# Patient Record
Sex: FEMALE | Race: WHITE | NOT HISPANIC OR LATINO | URBAN - METROPOLITAN AREA
[De-identification: names, ages, dates, MRNs, and addresses within clinical notes are randomized per-mention and may not be internally consistent; named-entity substitution may affect disease eponyms.]

---

## 2020-07-25 ENCOUNTER — TELEPHONE ENCOUNTER (OUTPATIENT)
Dept: URBAN - METROPOLITAN AREA CLINIC 13 | Facility: CLINIC | Age: 65
End: 2020-07-25

## 2020-07-25 RX ORDER — METOPROLOL TARTRATE 25 MG/1
TAKE 1 TABLET DAILY TABLET, FILM COATED ORAL
Refills: 0 | OUTPATIENT
End: 2018-11-30

## 2020-07-25 RX ORDER — POLYETHYLENE GLYCOL 3350, SODIUM SULFATE, SODIUM CHLORIDE, POTASSIUM CHLORIDE, ASCORBIC ACID, SODIUM ASCORBATE 7.5-2.691G
TAKE 1 ML AS DIRECTED KIT ORAL
Qty: 1 | Refills: 0 | OUTPATIENT
Start: 2013-06-05 | End: 2013-06-26

## 2020-07-25 RX ORDER — POLYETHYLENE GLYCOL 3350, SODIUM SULFATE, SODIUM CHLORIDE, POTASSIUM CHLORIDE, ASCORBIC ACID, SODIUM ASCORBATE 7.5-2.691G
TAKE 1 ML AS DIRECTED KIT ORAL
Qty: 1 | Refills: 0 | OUTPATIENT
Start: 2012-07-17 | End: 2012-08-13

## 2020-07-25 RX ORDER — ALBUTEROL SULFATE 2.5 MG/3ML
USE 1 UNIT DOSE IN NEBULIZER 4 TIMES DAILY SOLUTION RESPIRATORY (INHALATION)
Refills: 0 | OUTPATIENT
Start: 2018-11-30 | End: 2019-01-21

## 2020-07-25 RX ORDER — POLYETHYLENE GLYCOL 3350, SODIUM CHLORIDE, SODIUM BICARBONATE AND POTASSIUM CHLORIDE WITH LEMON FLAVOR 420; 11.2; 5.72; 1.48 G/4L; G/4L; G/4L; G/4L
TAKE 1/2 GALLON AT 5:00 PM DAY BEFORE PROCEDURE, TAKE SECOND 1/2 OF GALLON 6 HRS PRIOR TO PROCEDURE POWDER, FOR SOLUTION ORAL
Qty: 1 | Refills: 0 | OUTPATIENT
Start: 2018-11-30 | End: 2019-01-21

## 2020-07-25 RX ORDER — POLYETHYLENE GLYCOL 3350, SODIUM SULFATE, SODIUM CHLORIDE, POTASSIUM CHLORIDE, ASCORBIC ACID, SODIUM ASCORBATE 7.5-2.691G
TAKE 32 OZ AS DIRECTED 5:00PM THE EVENING BEFORE AND 6HR PRIOR TO PROCEDURE KIT ORAL
Qty: 1 | Refills: 0 | OUTPATIENT
Start: 2014-08-28 | End: 2014-09-24

## 2020-07-25 RX ORDER — IPRATROPIUM BROMIDE AND ALBUTEROL SULFATE 2.5; .5 MG/3ML; MG/3ML
USE 1 UNIT DOSE IN NEBULIZER EVERY 4 HOURS AS NEEDED SOLUTION RESPIRATORY (INHALATION)
Refills: 0 | OUTPATIENT
Start: 2018-11-30 | End: 2019-01-21

## 2020-07-25 RX ORDER — IBANDRONATE SODIUM 150 MG
1 (ONE) TABLET, ORAL, ONCE MONTHLY IN AM WITH WATER ON EMPTY STOMACH TABLET ORAL
Qty: 1 | Refills: 0 | OUTPATIENT
Start: 2011-11-08 | End: 2018-11-30

## 2020-07-25 RX ORDER — LOSARTAN POTASSIUM 50 MG/1
TAKE 1 TABLET EVERY DAY TABLET, FILM COATED ORAL
Qty: 30 | Refills: 0 | OUTPATIENT
Start: 2011-12-07 | End: 2018-11-30

## 2020-07-26 ENCOUNTER — TELEPHONE ENCOUNTER (OUTPATIENT)
Dept: URBAN - METROPOLITAN AREA CLINIC 13 | Facility: CLINIC | Age: 65
End: 2020-07-26

## 2020-07-26 RX ORDER — FLUTICASONE PROPIONATE 50 UG/1
SPRAY, METERED NASAL
Qty: 16 | Refills: 0 | Status: ACTIVE | COMMUNITY
Start: 2013-07-09

## 2020-07-26 RX ORDER — MAGNESIUM OXIDE 400 MG/1
TAKE 1 TABLET DAILY TABLET ORAL
Refills: 0 | Status: ACTIVE | COMMUNITY
Start: 2018-11-30

## 2020-07-26 RX ORDER — ERLOTINIB HYDROCHLORIDE 150 MG/1
TABLET ORAL
Qty: 30 | Refills: 0 | Status: ACTIVE | COMMUNITY
Start: 2017-12-18

## 2020-07-26 RX ORDER — ALBUTEROL SULFATE 90 UG/1
INHALE 1 PUFF EVERY 4 HOURS AS NEEDED AEROSOL, METERED RESPIRATORY (INHALATION)
Refills: 0 | Status: ACTIVE | COMMUNITY
Start: 2018-11-30

## 2020-07-26 RX ORDER — ALPRAZOLAM 0.5 MG/1
TAKE 1 TABLET TWICE DAILY AS NEEDED TABLET ORAL
Refills: 0 | Status: ACTIVE | COMMUNITY
Start: 2018-07-06

## 2020-07-26 RX ORDER — APIXABAN 5 MG/1
TAKE 1 TABLET TWICE DAILY TABLET, FILM COATED ORAL
Refills: 0 | Status: ACTIVE | COMMUNITY
Start: 2018-11-16

## 2020-07-26 RX ORDER — OMEPRAZOLE 40 MG/1
TAKE 1 CAPSULE BY MOUTH 30 MINUTES BEFORE BREAKFAST CAPSULE, DELAYED RELEASE ORAL
Qty: 30 | Refills: 11 | Status: ACTIVE | COMMUNITY
Start: 2019-01-07

## 2020-07-26 RX ORDER — BUPROPION HCL 300 MG
TAKE 1 TABLET DAILY TABLET, EXTENDED RELEASE 24 HR ORAL
Refills: 0 | Status: ACTIVE | COMMUNITY
Start: 2018-11-30

## 2020-07-26 RX ORDER — MAGNESIUM OXIDE TAB 400 MG (241.3 MG ELEMENTAL MG) 400 (241.3 MG) MG
TAKE 1 TABLET BY MOUTH TWICE A DAY TAB ORAL
Qty: 60 | Refills: 0 | Status: ACTIVE | COMMUNITY
Start: 2018-07-06

## 2020-07-26 RX ORDER — TEMAZEPAM 15 MG/1
CAPSULE ORAL
Qty: 19 | Refills: 0 | Status: ACTIVE | COMMUNITY
Start: 2018-06-04

## 2020-07-26 RX ORDER — DOXYCYCLINE 100 MG/1
TAKE ONE CAPSULE BY MOUTH TWICE A DAY CAPSULE ORAL
Qty: 20 | Refills: 0 | Status: ACTIVE | COMMUNITY
Start: 2011-08-19

## 2020-07-26 RX ORDER — LORATADINE 5 MG/5 ML
TAKE 1 CAPSULE DAILY SOLUTION, ORAL ORAL
Refills: 0 | Status: ACTIVE | COMMUNITY

## 2020-07-26 RX ORDER — MAGNESIUM OXIDE TAB 400 MG (241.3 MG ELEMENTAL MG) 400 (241.3 MG) MG
TAKE 1 TABLET BY MOUTH TWICE A DAY TAB ORAL
Qty: 60 | Refills: 0 | Status: ACTIVE | COMMUNITY
Start: 2017-11-27

## 2020-07-26 RX ORDER — TEMAZEPAM 30 MG/1
TAKE 1 CAPSULE AT BEDTIME AS NEEDED CAPSULE ORAL
Refills: 0 | Status: ACTIVE | COMMUNITY
Start: 2018-11-30

## 2020-07-26 RX ORDER — TEMAZEPAM 15 MG/1
CAPSULE ORAL
Qty: 30 | Refills: 0 | Status: ACTIVE | COMMUNITY
Start: 2018-04-30

## 2020-07-26 RX ORDER — DIAZEPAM 5 MG/1
TABLET ORAL
Qty: 2 | Refills: 0 | Status: ACTIVE | COMMUNITY
Start: 2018-06-11

## 2020-07-26 RX ORDER — ALPRAZOLAM 0.25 MG/1
TABLET ORAL
Qty: 10 | Refills: 0 | Status: ACTIVE | COMMUNITY
Start: 2013-05-22

## 2020-07-26 RX ORDER — NIVOLUMAB 10 MG/ML
INJECT EVERY OTHER WEEK INJECTION INTRAVENOUS
Qty: 1 | Refills: 0 | Status: ACTIVE | COMMUNITY
Start: 2018-11-30

## 2020-07-26 RX ORDER — AZITHROMYCIN DIHYDRATE 250 MG/1
TABLET, FILM COATED ORAL
Qty: 6 | Refills: 0 | Status: ACTIVE | COMMUNITY
Start: 2012-10-29

## 2020-07-26 RX ORDER — ALBUTEROL SULFATE 90 UG/1
AEROSOL, METERED RESPIRATORY (INHALATION)
Qty: 18 | Refills: 0 | Status: ACTIVE | COMMUNITY
Start: 2012-10-29

## 2020-07-26 RX ORDER — METOPROLOL SUCCINATE 25 MG/1
TAKE 1 TABLET ONCE DAILY TABLET, EXTENDED RELEASE ORAL
Refills: 0 | Status: ACTIVE | COMMUNITY
Start: 2018-01-22

## 2020-07-26 RX ORDER — HYDROCODONE POLISTIREX AND CHLORPHENIRAMINE POLISTIREX 10; 8 MG/5ML; MG/5ML
SUSPENSION, EXTENDED RELEASE ORAL
Qty: 60 | Refills: 0 | Status: ACTIVE | COMMUNITY
Start: 2018-02-21

## 2020-07-26 RX ORDER — METOPROLOL SUCCINATE 25 MG/1
TABLET, EXTENDED RELEASE ORAL
Qty: 30 | Refills: 0 | Status: ACTIVE | COMMUNITY
Start: 2013-05-22

## 2020-07-26 RX ORDER — CHLORHEXIDINE GLUCONATE 4 %
TAKE 1 TABLET BY MOUTH TWICE A DAY LIQUID (ML) TOPICAL
Qty: 60 | Refills: 0 | Status: ACTIVE | COMMUNITY
Start: 2018-07-06

## 2020-07-26 RX ORDER — METOPROLOL SUCCINATE 25 MG/1
TABLET, EXTENDED RELEASE ORAL
Qty: 30 | Refills: 0 | Status: ACTIVE | COMMUNITY
Start: 2012-11-20

## 2020-07-26 RX ORDER — METOPROLOL SUCCINATE 25 MG/1
TABLET, EXTENDED RELEASE ORAL
Qty: 30 | Refills: 0 | Status: ACTIVE | COMMUNITY
Start: 2013-11-18

## 2020-07-26 RX ORDER — BENZONATATE 100 MG/1
CAPSULE ORAL
Qty: 90 | Refills: 0 | Status: ACTIVE | COMMUNITY
Start: 2018-02-21

## 2020-07-26 RX ORDER — OXYCODONE AND ACETAMINOPHEN 10; 325 MG/1; MG/1
TABLET ORAL
Qty: 60 | Refills: 0 | Status: ACTIVE | COMMUNITY
Start: 2018-02-21

## 2020-07-26 RX ORDER — TEMAZEPAM 15 MG/1
CAPSULE ORAL
Qty: 30 | Refills: 0 | Status: ACTIVE | COMMUNITY
Start: 2018-07-06

## 2020-07-26 RX ORDER — ACETAMINOPHEN 500 MG
TAKE 1 TABLET DAILY TABLET ORAL
Refills: 0 | Status: ACTIVE | COMMUNITY
Start: 2018-11-30

## 2021-11-03 ENCOUNTER — OFFICE VISIT (OUTPATIENT)
Dept: URBAN - METROPOLITAN AREA CLINIC 107 | Facility: CLINIC | Age: 66
End: 2021-11-03

## 2021-11-03 RX ORDER — FLUTICASONE PROPIONATE 50 UG/1
SPRAY, METERED NASAL
Qty: 16 | Refills: 0 | Status: ACTIVE | COMMUNITY
Start: 2013-07-09

## 2021-11-03 RX ORDER — ALPRAZOLAM 0.5 MG/1
TAKE 1 TABLET TWICE DAILY AS NEEDED TABLET ORAL
Refills: 0 | Status: ACTIVE | COMMUNITY
Start: 2018-07-06

## 2021-11-03 RX ORDER — APIXABAN 5 MG/1
TAKE 1 TABLET TWICE DAILY TABLET, FILM COATED ORAL
Refills: 0 | Status: ACTIVE | COMMUNITY
Start: 2018-11-16

## 2021-11-03 RX ORDER — OMEPRAZOLE 40 MG/1
TAKE 1 CAPSULE BY MOUTH 30 MINUTES BEFORE BREAKFAST CAPSULE, DELAYED RELEASE ORAL
Qty: 30 | Refills: 11 | Status: ACTIVE | COMMUNITY
Start: 2019-01-07

## 2021-11-03 RX ORDER — ACETAMINOPHEN 500 MG
TAKE 1 TABLET DAILY TABLET ORAL
Refills: 0 | Status: ACTIVE | COMMUNITY
Start: 2018-11-30

## 2021-11-03 RX ORDER — AZITHROMYCIN DIHYDRATE 250 MG/1
TABLET, FILM COATED ORAL
Qty: 6 | Refills: 0 | Status: ACTIVE | COMMUNITY
Start: 2012-10-29

## 2021-11-03 RX ORDER — ALPRAZOLAM 0.25 MG/1
TABLET ORAL
Qty: 10 | Refills: 0 | Status: ACTIVE | COMMUNITY
Start: 2013-05-22

## 2021-11-03 RX ORDER — TEMAZEPAM 15 MG/1
CAPSULE ORAL
Qty: 30 | Refills: 0 | Status: ACTIVE | COMMUNITY
Start: 2018-04-30

## 2021-11-03 RX ORDER — ERLOTINIB HYDROCHLORIDE 150 MG/1
TABLET ORAL
Qty: 30 | Refills: 0 | Status: ACTIVE | COMMUNITY
Start: 2017-12-18

## 2021-11-03 RX ORDER — DOXYCYCLINE 100 MG/1
TAKE ONE CAPSULE BY MOUTH TWICE A DAY CAPSULE ORAL
Qty: 20 | Refills: 0 | Status: ACTIVE | COMMUNITY
Start: 2011-08-19

## 2021-11-03 RX ORDER — METOPROLOL SUCCINATE 25 MG/1
TABLET, EXTENDED RELEASE ORAL
Qty: 30 | Refills: 0 | Status: ACTIVE | COMMUNITY
Start: 2012-11-20

## 2021-11-03 RX ORDER — ALBUTEROL SULFATE 90 UG/1
AEROSOL, METERED RESPIRATORY (INHALATION)
Qty: 18 | Refills: 0 | Status: ACTIVE | COMMUNITY
Start: 2012-10-29

## 2021-11-03 RX ORDER — DIAZEPAM 5 MG/1
TABLET ORAL
Qty: 2 | Refills: 0 | Status: ACTIVE | COMMUNITY
Start: 2018-06-11

## 2021-11-03 RX ORDER — MAGNESIUM OXIDE 400 MG/1
TAKE 1 TABLET DAILY TABLET ORAL
Refills: 0 | Status: ACTIVE | COMMUNITY
Start: 2018-11-30

## 2021-11-03 RX ORDER — CHLORHEXIDINE GLUCONATE 4 %
TAKE 1 TABLET BY MOUTH TWICE A DAY LIQUID (ML) TOPICAL
Qty: 60 | Refills: 0 | Status: ACTIVE | COMMUNITY
Start: 2018-07-06

## 2021-11-03 RX ORDER — BENZONATATE 100 MG/1
CAPSULE ORAL
Qty: 90 | Refills: 0 | Status: ACTIVE | COMMUNITY
Start: 2018-02-21

## 2021-11-03 RX ORDER — BUPROPION HCL 300 MG
TAKE 1 TABLET DAILY TABLET, EXTENDED RELEASE 24 HR ORAL
Refills: 0 | Status: ACTIVE | COMMUNITY
Start: 2018-11-30

## 2021-11-03 RX ORDER — LORATADINE 5 MG/5 ML
TAKE 1 CAPSULE DAILY SOLUTION, ORAL ORAL
Refills: 0 | Status: ACTIVE | COMMUNITY

## 2021-11-03 RX ORDER — TEMAZEPAM 30 MG/1
TAKE 1 CAPSULE AT BEDTIME AS NEEDED CAPSULE ORAL
Refills: 0 | Status: ACTIVE | COMMUNITY
Start: 2018-11-30

## 2021-11-03 RX ORDER — HYDROCODONE POLISTIREX AND CHLORPHENIRAMINE POLISTIREX 10; 8 MG/5ML; MG/5ML
SUSPENSION, EXTENDED RELEASE ORAL
Qty: 60 | Refills: 0 | Status: ACTIVE | COMMUNITY
Start: 2018-02-21

## 2021-11-03 RX ORDER — MAGNESIUM OXIDE TAB 400 MG (241.3 MG ELEMENTAL MG) 400 (241.3 MG) MG
TAKE 1 TABLET BY MOUTH TWICE A DAY TAB ORAL
Qty: 60 | Refills: 0 | Status: ACTIVE | COMMUNITY
Start: 2017-11-27

## 2021-11-03 RX ORDER — NIVOLUMAB 10 MG/ML
INJECT EVERY OTHER WEEK INJECTION INTRAVENOUS
Qty: 1 | Refills: 0 | Status: ACTIVE | COMMUNITY
Start: 2018-11-30

## 2021-11-03 RX ORDER — OXYCODONE AND ACETAMINOPHEN 10; 325 MG/1; MG/1
TABLET ORAL
Qty: 60 | Refills: 0 | Status: ACTIVE | COMMUNITY
Start: 2018-02-21

## 2021-12-23 ENCOUNTER — OFFICE VISIT (OUTPATIENT)
Dept: URBAN - METROPOLITAN AREA CLINIC 113 | Facility: CLINIC | Age: 66
End: 2021-12-23

## 2022-01-04 ENCOUNTER — OFFICE VISIT (OUTPATIENT)
Dept: URBAN - METROPOLITAN AREA CLINIC 113 | Facility: CLINIC | Age: 67
End: 2022-01-04
Payer: MEDICARE

## 2022-01-04 ENCOUNTER — LAB OUTSIDE AN ENCOUNTER (OUTPATIENT)
Dept: URBAN - METROPOLITAN AREA CLINIC 113 | Facility: CLINIC | Age: 67
End: 2022-01-04

## 2022-01-04 VITALS
HEART RATE: 91 BPM | TEMPERATURE: 98.1 F | RESPIRATION RATE: 18 BRPM | HEIGHT: 64 IN | WEIGHT: 138 LBS | DIASTOLIC BLOOD PRESSURE: 82 MMHG | BODY MASS INDEX: 23.56 KG/M2 | SYSTOLIC BLOOD PRESSURE: 117 MMHG

## 2022-01-04 DIAGNOSIS — K44.9 HIATAL HERNIA: ICD-10-CM

## 2022-01-04 DIAGNOSIS — R68.81 EARLY SATIETY: ICD-10-CM

## 2022-01-04 DIAGNOSIS — K21.9 GASTROESOPHAGEAL REFLUX DISEASE, UNSPECIFIED WHETHER ESOPHAGITIS PRESENT: ICD-10-CM

## 2022-01-04 DIAGNOSIS — Z86.010 HISTORY OF COLON POLYPS: ICD-10-CM

## 2022-01-04 DIAGNOSIS — Z80.0 FAMILY HISTORY OF COLON CANCER IN MOTHER: ICD-10-CM

## 2022-01-04 DIAGNOSIS — R05.9 COUGH: ICD-10-CM

## 2022-01-04 PROCEDURE — 99214 OFFICE O/P EST MOD 30 MIN: CPT | Performed by: INTERNAL MEDICINE

## 2022-01-04 RX ORDER — LORATADINE 5 MG/5 ML
TAKE 1 CAPSULE DAILY SOLUTION, ORAL ORAL
Refills: 0 | Status: DISCONTINUED | COMMUNITY

## 2022-01-04 RX ORDER — ERLOTINIB HYDROCHLORIDE 150 MG/1
TABLET ORAL
Qty: 30 | Refills: 0 | Status: DISCONTINUED | COMMUNITY
Start: 2017-12-18

## 2022-01-04 RX ORDER — NIVOLUMAB 10 MG/ML
INJECT EVERY OTHER WEEK INJECTION INTRAVENOUS
Qty: 1 | Refills: 0 | Status: DISCONTINUED | COMMUNITY
Start: 2018-11-30

## 2022-01-04 RX ORDER — METOPROLOL SUCCINATE 25 MG/1
TABLET, EXTENDED RELEASE ORAL
Qty: 30 | Refills: 0 | Status: ACTIVE | COMMUNITY
Start: 2012-11-20

## 2022-01-04 RX ORDER — DOXYCYCLINE 100 MG/1
TAKE ONE CAPSULE BY MOUTH TWICE A DAY CAPSULE ORAL
Qty: 20 | Refills: 0 | Status: DISCONTINUED | COMMUNITY
Start: 2011-08-19

## 2022-01-04 RX ORDER — DIAZEPAM 5 MG/1
TABLET ORAL
Qty: 2 | Refills: 0 | Status: DISCONTINUED | COMMUNITY
Start: 2018-06-11

## 2022-01-04 RX ORDER — MAGNESIUM OXIDE 400 MG/1
TAKE 1 TABLET DAILY TABLET ORAL
Refills: 0 | Status: DISCONTINUED | COMMUNITY
Start: 2018-11-30

## 2022-01-04 RX ORDER — ALBUTEROL SULFATE 90 UG/1
AEROSOL, METERED RESPIRATORY (INHALATION)
Qty: 18 | Refills: 0 | Status: DISCONTINUED | COMMUNITY
Start: 2012-10-29

## 2022-01-04 RX ORDER — AZITHROMYCIN DIHYDRATE 250 MG/1
TABLET, FILM COATED ORAL
Qty: 6 | Refills: 0 | Status: DISCONTINUED | COMMUNITY
Start: 2012-10-29

## 2022-01-04 RX ORDER — ALPRAZOLAM 0.5 MG/1
TAKE 1 TABLET TWICE DAILY AS NEEDED TABLET ORAL
Refills: 0 | Status: ACTIVE | COMMUNITY
Start: 2018-07-06

## 2022-01-04 RX ORDER — BUPROPION HCL 300 MG
TAKE 1 TABLET DAILY TABLET, EXTENDED RELEASE 24 HR ORAL
Refills: 0 | Status: ACTIVE | COMMUNITY
Start: 2018-11-30

## 2022-01-04 RX ORDER — AZELASTINE HYDROCHLORIDE AND FLUTICASONE PROPIONATE 137; 50 UG/1; UG/1
1 SPRAY IN EACH NOSTRIL SPRAY, METERED NASAL TWICE A DAY
Status: ACTIVE | COMMUNITY

## 2022-01-04 RX ORDER — TEMAZEPAM 15 MG/1
CAPSULE ORAL
Qty: 30 | Refills: 0 | Status: DISCONTINUED | COMMUNITY
Start: 2018-04-30

## 2022-01-04 RX ORDER — CHLORHEXIDINE GLUCONATE 4 %
TAKE 1 TABLET BY MOUTH TWICE A DAY LIQUID (ML) TOPICAL
Qty: 60 | Refills: 0 | Status: ACTIVE | COMMUNITY
Start: 2018-07-06

## 2022-01-04 RX ORDER — TEMAZEPAM 30 MG/1
TAKE 1 CAPSULE AT BEDTIME AS NEEDED CAPSULE ORAL
Refills: 0 | Status: ACTIVE | COMMUNITY
Start: 2018-11-30

## 2022-01-04 RX ORDER — BENZONATATE 100 MG/1
CAPSULE ORAL
Qty: 90 | Refills: 0 | Status: DISCONTINUED | COMMUNITY
Start: 2018-02-21

## 2022-01-04 RX ORDER — FLUTICASONE PROPIONATE 50 UG/1
SPRAY, METERED NASAL
Qty: 16 | Refills: 0 | Status: DISCONTINUED | COMMUNITY
Start: 2013-07-09

## 2022-01-04 RX ORDER — ALPRAZOLAM 0.25 MG/1
TABLET ORAL
Qty: 10 | Refills: 0 | Status: DISCONTINUED | COMMUNITY
Start: 2013-05-22

## 2022-01-04 RX ORDER — HYDROCODONE POLISTIREX AND CHLORPHENIRAMINE POLISTIREX 10; 8 MG/5ML; MG/5ML
SUSPENSION, EXTENDED RELEASE ORAL
Qty: 60 | Refills: 0 | Status: DISCONTINUED | COMMUNITY
Start: 2018-02-21

## 2022-01-04 RX ORDER — MAGNESIUM OXIDE TAB 400 MG (241.3 MG ELEMENTAL MG) 400 (241.3 MG) MG
TAKE 1 TABLET BY MOUTH TWICE A DAY TAB ORAL
Qty: 60 | Refills: 0 | Status: DISCONTINUED | COMMUNITY
Start: 2017-11-27

## 2022-01-04 RX ORDER — OMEPRAZOLE 40 MG/1
2 CAPSULES CAPSULE, DELAYED RELEASE ORAL TWICE DAILY
Refills: 11 | Status: ACTIVE | COMMUNITY
Start: 2019-01-07

## 2022-01-04 RX ORDER — OXYCODONE AND ACETAMINOPHEN 10; 325 MG/1; MG/1
TABLET ORAL
Qty: 60 | Refills: 0 | Status: DISCONTINUED | COMMUNITY
Start: 2018-02-21

## 2022-01-04 RX ORDER — ACETAMINOPHEN 500 MG
TAKE 1 TABLET DAILY TABLET ORAL
Refills: 0 | Status: DISCONTINUED | COMMUNITY
Start: 2018-11-30

## 2022-01-04 RX ORDER — APIXABAN 5 MG/1
TAKE 1 TABLET TWICE DAILY TABLET, FILM COATED ORAL
Refills: 0 | Status: ACTIVE | COMMUNITY
Start: 2018-11-16

## 2022-01-04 RX ORDER — NIVOLUMAB 10 MG/ML
AS DIRECTED INJECTION INTRAVENOUS EVERY 2 WEEKS
Status: ACTIVE | COMMUNITY

## 2022-01-04 NOTE — HPI-TODAY'S VISIT:
The patient is a very pleasant 66-year-old female last seen in the office in January 2019 to discuss management of a large tubulovillous adenoma.  She had been diagnosed at that time though with lung cancer with brain metastases in December 2016.  Colonoscopy in January 2019 due to a history of colon polyps and colon rectal cancer in her mother over age 60 revealed a tattoo in the cecum and a 3 mm transverse colon polyp.  There was a good prep present.  She was recommended a 5-year follow-up. The patient comes in now with a new problem.  She states that over the last year she has developed problems at night often waking up coughing.  Sometimes coughing to the point that she vomits.  She was having bad heartburn and her primary care physician increased her omeprazole from 40 mg twice a day to 80 mg twice a day and she has had good control of her heartburn but continues to have the cough.  She states her primary care physician tried to switch her to Dexilant but her insurance would not cover this.  She denies any dysphagia.  There is no nausea.  There is some early satiety and bloating.  Her bowel movements are normal.  She states her CT scans to follow-up her lung cancer have routinely shown a hiatal hernia.  We do not have those results available to us though.

## 2022-02-01 ENCOUNTER — OFFICE VISIT (OUTPATIENT)
Dept: URBAN - METROPOLITAN AREA SURGERY CENTER 25 | Facility: SURGERY CENTER | Age: 67
End: 2022-02-01
Payer: MEDICARE

## 2022-02-01 ENCOUNTER — CLAIMS CREATED FROM THE CLAIM WINDOW (OUTPATIENT)
Dept: URBAN - METROPOLITAN AREA CLINIC 4 | Facility: CLINIC | Age: 67
End: 2022-02-01
Payer: MEDICARE

## 2022-02-01 DIAGNOSIS — K31.89 DUODENAL ERYTHEMA: ICD-10-CM

## 2022-02-01 DIAGNOSIS — K31.89 GASTRIC FOVEOLAR HYPERPLASIA: ICD-10-CM

## 2022-02-01 DIAGNOSIS — K21.9 GASTROESOPHAGEAL REFLUX DISEASE: ICD-10-CM

## 2022-02-01 DIAGNOSIS — K31.7 POLYP OF STOMACH FUNDIC GLAND: ICD-10-CM

## 2022-02-01 DIAGNOSIS — K31.7 POLYP OF STOMACH AND DUODENUM: ICD-10-CM

## 2022-02-01 PROCEDURE — 43239 EGD BIOPSY SINGLE/MULTIPLE: CPT | Performed by: INTERNAL MEDICINE

## 2022-02-01 PROCEDURE — 88342 IMHCHEM/IMCYTCHM 1ST ANTB: CPT | Performed by: PATHOLOGY

## 2022-02-01 PROCEDURE — G8907 PT DOC NO EVENTS ON DISCHARG: HCPCS | Performed by: INTERNAL MEDICINE

## 2022-02-01 PROCEDURE — 88305 TISSUE EXAM BY PATHOLOGIST: CPT | Performed by: PATHOLOGY

## 2022-02-01 PROCEDURE — 88312 SPECIAL STAINS GROUP 1: CPT | Performed by: PATHOLOGY

## 2022-02-01 RX ORDER — AZELASTINE HYDROCHLORIDE AND FLUTICASONE PROPIONATE 137; 50 UG/1; UG/1
1 SPRAY IN EACH NOSTRIL SPRAY, METERED NASAL TWICE A DAY
Status: ACTIVE | COMMUNITY

## 2022-02-01 RX ORDER — OMEPRAZOLE 40 MG/1
2 CAPSULES CAPSULE, DELAYED RELEASE ORAL TWICE DAILY
Refills: 11 | Status: ACTIVE | COMMUNITY
Start: 2019-01-07

## 2022-02-01 RX ORDER — METOPROLOL SUCCINATE 25 MG/1
TABLET, EXTENDED RELEASE ORAL
Qty: 30 | Refills: 0 | Status: ACTIVE | COMMUNITY
Start: 2012-11-20

## 2022-02-01 RX ORDER — CHLORHEXIDINE GLUCONATE 4 %
TAKE 1 TABLET BY MOUTH TWICE A DAY LIQUID (ML) TOPICAL
Qty: 60 | Refills: 0 | Status: ACTIVE | COMMUNITY
Start: 2018-07-06

## 2022-02-01 RX ORDER — TEMAZEPAM 30 MG/1
TAKE 1 CAPSULE AT BEDTIME AS NEEDED CAPSULE ORAL
Refills: 0 | Status: ACTIVE | COMMUNITY
Start: 2018-11-30

## 2022-02-01 RX ORDER — ALPRAZOLAM 0.5 MG/1
TAKE 1 TABLET TWICE DAILY AS NEEDED TABLET ORAL
Refills: 0 | Status: ACTIVE | COMMUNITY
Start: 2018-07-06

## 2022-02-01 RX ORDER — NIVOLUMAB 10 MG/ML
AS DIRECTED INJECTION INTRAVENOUS EVERY 2 WEEKS
Status: ACTIVE | COMMUNITY

## 2022-02-01 RX ORDER — BUPROPION HCL 300 MG
TAKE 1 TABLET DAILY TABLET, EXTENDED RELEASE 24 HR ORAL
Refills: 0 | Status: ACTIVE | COMMUNITY
Start: 2018-11-30

## 2022-02-01 RX ORDER — APIXABAN 5 MG/1
TAKE 1 TABLET TWICE DAILY TABLET, FILM COATED ORAL
Refills: 0 | Status: ACTIVE | COMMUNITY
Start: 2018-11-16

## 2022-03-28 ENCOUNTER — OFFICE VISIT (OUTPATIENT)
Dept: URBAN - METROPOLITAN AREA CLINIC 113 | Facility: CLINIC | Age: 67
End: 2022-03-28
Payer: MEDICARE

## 2022-03-28 VITALS
HEART RATE: 77 BPM | WEIGHT: 140 LBS | TEMPERATURE: 98.2 F | DIASTOLIC BLOOD PRESSURE: 90 MMHG | BODY MASS INDEX: 23.9 KG/M2 | SYSTOLIC BLOOD PRESSURE: 140 MMHG | HEIGHT: 64 IN

## 2022-03-28 DIAGNOSIS — R05.9 COUGH: ICD-10-CM

## 2022-03-28 DIAGNOSIS — K21.9 GASTROESOPHAGEAL REFLUX DISEASE, UNSPECIFIED WHETHER ESOPHAGITIS PRESENT: ICD-10-CM

## 2022-03-28 DIAGNOSIS — K44.9 HIATAL HERNIA: ICD-10-CM

## 2022-03-28 DIAGNOSIS — R11.2 NAUSEA AND VOMITING, UNSPECIFIED VOMITING TYPE: ICD-10-CM

## 2022-03-28 PROCEDURE — 99213 OFFICE O/P EST LOW 20 MIN: CPT

## 2022-03-28 RX ORDER — OMEPRAZOLE 40 MG/1
2 CAPSULES CAPSULE, DELAYED RELEASE ORAL TWICE DAILY
Refills: 11 | Status: ACTIVE | COMMUNITY
Start: 2019-01-07

## 2022-03-28 RX ORDER — METOPROLOL SUCCINATE 25 MG/1
TABLET, EXTENDED RELEASE ORAL
Qty: 30 | Refills: 0 | Status: ACTIVE | COMMUNITY
Start: 2012-11-20

## 2022-03-28 RX ORDER — APIXABAN 5 MG/1
TAKE 1 TABLET TWICE DAILY TABLET, FILM COATED ORAL
Refills: 0 | Status: ACTIVE | COMMUNITY
Start: 2018-11-16

## 2022-03-28 RX ORDER — CHLORHEXIDINE GLUCONATE 4 %
TAKE 1 TABLET BY MOUTH TWICE A DAY LIQUID (ML) TOPICAL
Qty: 60 | Refills: 0 | Status: ACTIVE | COMMUNITY
Start: 2018-07-06

## 2022-03-28 RX ORDER — TEMAZEPAM 30 MG/1
TAKE 1 CAPSULE AT BEDTIME AS NEEDED CAPSULE ORAL
Refills: 0 | Status: ACTIVE | COMMUNITY
Start: 2018-11-30

## 2022-03-28 RX ORDER — AZELASTINE HYDROCHLORIDE AND FLUTICASONE PROPIONATE 137; 50 UG/1; UG/1
1 SPRAY IN EACH NOSTRIL SPRAY, METERED NASAL TWICE A DAY
Status: ACTIVE | COMMUNITY

## 2022-03-28 RX ORDER — OMEPRAZOLE 40 MG/1
2 CAPSULES CAPSULE, DELAYED RELEASE ORAL TWICE DAILY
OUTPATIENT
Start: 2019-01-07

## 2022-03-28 RX ORDER — NIVOLUMAB 10 MG/ML
AS DIRECTED INJECTION INTRAVENOUS EVERY 2 WEEKS
Status: ACTIVE | COMMUNITY

## 2022-03-28 RX ORDER — BUPROPION HCL 300 MG
TAKE 1 TABLET DAILY TABLET, EXTENDED RELEASE 24 HR ORAL
Refills: 0 | Status: ACTIVE | COMMUNITY
Start: 2018-11-30

## 2022-03-28 RX ORDER — ALPRAZOLAM 0.5 MG/1
TAKE 1 TABLET TWICE DAILY AS NEEDED TABLET ORAL
Refills: 0 | Status: ACTIVE | COMMUNITY
Start: 2018-07-06

## 2022-03-28 NOTE — HPI-OTHER HISTORIES
CT scan of the chest/abdomen/pelvis with contrast 10/14/2021: No evidence of malignancy in the chest, abdomen or pelvis.  Posttreatment scarring in the upper lobe of the left lung is unchanged since prior examination 6/1/2021.  Previously seen inflammatory/infectious groundglass opacities in the upper lobe of the left lung are resolved.  Small left pleural effusion is decreased.  Coronary artery calcifications.  2.6 mm nodule in the right thyroid is unchanged.  Findings of chronic central venous occlusion/stenosis probably involving the left subclavian and innominate veins and possibly the lower superior vena cava which is unchanged.  Small hiatal hernia.  Small cyst in both ovaries are not changed.

## 2022-03-28 NOTE — HPI-TODAY'S VISIT:
(1/4/22): The patient is a very pleasant 66-year-old female last seen in the office in January 2019 to discuss management of a large tubulovillous adenoma.  She had been diagnosed at that time though with lung cancer with brain metastases in December 2016.  Colonoscopy in January 2019 due to a history of colon polyps and colon rectal cancer in her mother over age 60 revealed a tattoo in the cecum and a 3 mm transverse colon polyp.  There was a good prep present.  She was recommended a 5-year follow-up.  The patient comes in now with a new problem.  She states that over the last year she has developed problems at night often waking up coughing.  Sometimes coughing to the point that she vomits.  She was having bad heartburn and her primary care physician increased her omeprazole from 40 mg twice a day to 80 mg twice a day, and she has had good control of her heartburn but continues to have the cough.  She states her primary care physician tried to switch her to Dexilant but her insurance would not cover this.  She denies any dysphagia.  There is no nausea.  There is some early satiety and bloating.  Her bowel movements are normal.  She states her CT scans to follow-up her lung cancer have routinely shown a hiatal hernia.  We do not have those results available to us though.   Interval history: 66-year-old female with history of colon polyps and family history of colon cancer due surveillance 2024 presents for follow-up after EGD.  She was last seen 1/4/2022.  She complained of nighttime coughing which was suspected to be secondary to acid reflux however symptoms continued despite being on high dose of omeprazole.  There was possibility of partial gastric outlet obstruction or gastroparesis. She was planned for a gastric emptying scan and EGD for further evaluation.  GES 2/21/2022: No evidence of delayed gastric emptying.  EGD 2/1/2022:Mucosal changes in the duodenum which were biopsied.  Medium size hiatal hernia.  2 gastric polyps which were biopsied.  Normal esophagus.  Duodenal biopsy revealed no significant abnormality.  Stomach biopsy revealed polypoid foveolar hyperplasia and fundic gland polyp without evidence of H. pylori, intestinal metaplasia, dysplasia or malignancy.   She has experienced one episode of vomiting since her last visit. She usually feels fine when she goes to bed and will wake up in the middle of the night/early morning with nausea then coughing and vomiting. She does feel sinus drainage may contribute to her coughing. She is not currently on any antihistamines though she does have a history of seasonal allergies. She feels as if something is tickling her throat. The vomit does contain food on occasion. No unintentional weight loss. No abdominal pain. Bowel movements are normal. She tries to avoid spicy foods. She does not believe there is a food trigger, but she admits the vomiting will occur if she overeats. She is planned for a chest CT scan on Monday. She has a CT scan every 4 months for lung cancer surveillance. She just had a brain MRI which was clear. She gets brain MRIs with Dr. Negrete every 6 months.

## 2022-06-14 ENCOUNTER — OFFICE VISIT (OUTPATIENT)
Dept: URBAN - METROPOLITAN AREA CLINIC 113 | Facility: CLINIC | Age: 67
End: 2022-06-14
Payer: MEDICARE

## 2022-06-14 VITALS
TEMPERATURE: 97.8 F | HEART RATE: 74 BPM | WEIGHT: 142 LBS | HEIGHT: 64 IN | DIASTOLIC BLOOD PRESSURE: 92 MMHG | BODY MASS INDEX: 24.24 KG/M2 | SYSTOLIC BLOOD PRESSURE: 141 MMHG

## 2022-06-14 DIAGNOSIS — Z80.0 FAMILY HISTORY OF COLON CANCER IN MOTHER: ICD-10-CM

## 2022-06-14 DIAGNOSIS — R11.2 NAUSEA AND VOMITING, UNSPECIFIED VOMITING TYPE: ICD-10-CM

## 2022-06-14 DIAGNOSIS — K44.9 HIATAL HERNIA: ICD-10-CM

## 2022-06-14 DIAGNOSIS — K21.9 GASTROESOPHAGEAL REFLUX DISEASE, UNSPECIFIED WHETHER ESOPHAGITIS PRESENT: ICD-10-CM

## 2022-06-14 DIAGNOSIS — Z86.010 HISTORY OF COLON POLYPS: ICD-10-CM

## 2022-06-14 DIAGNOSIS — R68.81 EARLY SATIETY: ICD-10-CM

## 2022-06-14 PROBLEM — 235595009: Status: ACTIVE | Noted: 2022-01-04

## 2022-06-14 PROBLEM — 428283002: Status: ACTIVE | Noted: 2022-01-04

## 2022-06-14 PROCEDURE — 99214 OFFICE O/P EST MOD 30 MIN: CPT | Performed by: INTERNAL MEDICINE

## 2022-06-14 RX ORDER — APIXABAN 5 MG/1
TAKE 1 TABLET TWICE DAILY TABLET, FILM COATED ORAL
Refills: 0 | Status: ACTIVE | COMMUNITY
Start: 2018-11-16

## 2022-06-14 RX ORDER — METOPROLOL SUCCINATE 25 MG/1
TABLET, EXTENDED RELEASE ORAL
Qty: 30 | Refills: 0 | Status: ACTIVE | COMMUNITY
Start: 2012-11-20

## 2022-06-14 RX ORDER — CHLORHEXIDINE GLUCONATE 4 %
TAKE 1 TABLET BY MOUTH TWICE A DAY LIQUID (ML) TOPICAL
Qty: 60 | Refills: 0 | Status: ACTIVE | COMMUNITY
Start: 2018-07-06

## 2022-06-14 RX ORDER — AZELASTINE HYDROCHLORIDE AND FLUTICASONE PROPIONATE 137; 50 UG/1; UG/1
1 SPRAY IN EACH NOSTRIL SPRAY, METERED NASAL TWICE A DAY
Status: ACTIVE | COMMUNITY

## 2022-06-14 RX ORDER — TEMAZEPAM 30 MG/1
TAKE 1 CAPSULE AT BEDTIME AS NEEDED CAPSULE ORAL
Refills: 0 | Status: ACTIVE | COMMUNITY
Start: 2018-11-30

## 2022-06-14 RX ORDER — OMEPRAZOLE 40 MG/1
2 CAPSULES CAPSULE, DELAYED RELEASE ORAL TWICE DAILY
Status: ACTIVE | COMMUNITY
Start: 2019-01-07

## 2022-06-14 RX ORDER — BUPROPION HCL 300 MG
TAKE 1 TABLET DAILY TABLET, EXTENDED RELEASE 24 HR ORAL
Refills: 0 | Status: ACTIVE | COMMUNITY
Start: 2018-11-30

## 2022-06-14 RX ORDER — ALPRAZOLAM 0.5 MG/1
TAKE 1 TABLET TWICE DAILY AS NEEDED TABLET ORAL
Refills: 0 | Status: ACTIVE | COMMUNITY
Start: 2018-07-06

## 2022-06-14 RX ORDER — NIVOLUMAB 10 MG/ML
AS DIRECTED INJECTION INTRAVENOUS EVERY 2 WEEKS
Status: ACTIVE | COMMUNITY

## 2022-06-14 NOTE — HPI-TODAY'S VISIT:
(1/4/22): The patient is a very pleasant 66-year-old female last seen in the office in January 2019 to discuss management of a large tubulovillous adenoma.  She had been diagnosed at that time though with lung cancer with brain metastases in December 2016.  Colonoscopy in January 2019 due to a history of colon polyps and colon rectal cancer in her mother over age 60 revealed a tattoo in the cecum and a 3 mm transverse colon polyp.  There was a good prep present.  She was recommended a 5-year follow-up.  The patient comes in now with a new problem.  She states that over the last year she has developed problems at night often waking up coughing.  Sometimes coughing to the point that she vomits.  She was having bad heartburn and her primary care physician increased her omeprazole from 40 mg twice a day to 80 mg twice a day, and she has had good control of her heartburn but continues to have the cough.  She states her primary care physician tried to switch her to Dexilant but her insurance would not cover this.  She denies any dysphagia.  There is no nausea.  There is some early satiety and bloating.  Her bowel movements are normal.  She states her CT scans to follow-up her lung cancer have routinely shown a hiatal hernia.  We do not have those results available to us though.   Interval history: 66-year-old female with history of colon polyps and family history of colon cancer due surveillance 2024 presents for follow-up after EGD.  She was last seen 1/4/2022.  She complained of nighttime coughing which was suspected to be secondary to acid reflux however symptoms continued despite being on high dose of omeprazole.  There was possibility of partial gastric outlet obstruction or gastroparesis. She was planned for a gastric emptying scan and EGD for further evaluation.  GES 2/21/2022: No evidence of delayed gastric emptying.  EGD 2/1/2022:Mucosal changes in the duodenum which were biopsied.  Medium size hiatal hernia.  2 gastric polyps which were biopsied.  Normal esophagus.  Duodenal biopsy revealed no significant abnormality.  Stomach biopsy revealed polypoid foveolar hyperplasia and fundic gland polyp without evidence of H. pylori, intestinal metaplasia, dysplasia or malignancy.   She has experienced one episode of vomiting since her last visit. She usually feels fine when she goes to bed and will wake up in the middle of the night/early morning with nausea then coughing and vomiting. She does feel sinus drainage may contribute to her coughing. She is not currently on any antihistamines though she does have a history of seasonal allergies. She feels as if something is tickling her throat. The vomit does contain food on occasion. No unintentional weight loss. No abdominal pain. Bowel movements are normal. She tries to avoid spicy foods. She does not believe there is a food trigger, but she admits the vomiting will occur if she overeats. She is planned for a chest CT scan on Monday. She has a CT scan every 4 months for lung cancer surveillance. She just had a brain MRI which was clear. She gets brain MRIs with Dr. Negrete every 6 months. Interval history.  The patient states her cough has resolved.  She is not sure why.  She does think twice a day proton pump inhibitors help but she also thinks a nasal spray that her pulmonologist placed her on has helped.  She states CT scan back in April showed no progression of her lung cancer and actually showed shrinkage of her longstanding goiter.

## 2024-01-30 ENCOUNTER — OFFICE VISIT (OUTPATIENT)
Dept: URBAN - METROPOLITAN AREA CLINIC 113 | Facility: CLINIC | Age: 69
End: 2024-01-30
Payer: MEDICARE

## 2024-01-30 ENCOUNTER — DASHBOARD ENCOUNTERS (OUTPATIENT)
Age: 69
End: 2024-01-30

## 2024-01-30 VITALS
RESPIRATION RATE: 16 BRPM | TEMPERATURE: 97.4 F | HEART RATE: 69 BPM | SYSTOLIC BLOOD PRESSURE: 131 MMHG | WEIGHT: 132 LBS | HEIGHT: 64 IN | BODY MASS INDEX: 22.53 KG/M2 | DIASTOLIC BLOOD PRESSURE: 73 MMHG

## 2024-01-30 DIAGNOSIS — Z86.010 HISTORY OF COLON POLYPS: ICD-10-CM

## 2024-01-30 DIAGNOSIS — Z80.0 FAMILY HISTORY OF COLON CANCER IN MOTHER: ICD-10-CM

## 2024-01-30 DIAGNOSIS — K21.9 GASTROESOPHAGEAL REFLUX DISEASE, UNSPECIFIED WHETHER ESOPHAGITIS PRESENT: ICD-10-CM

## 2024-01-30 DIAGNOSIS — R11.2 NAUSEA AND VOMITING, UNSPECIFIED VOMITING TYPE: ICD-10-CM

## 2024-01-30 DIAGNOSIS — K44.9 HIATAL HERNIA: ICD-10-CM

## 2024-01-30 DIAGNOSIS — R68.81 EARLY SATIETY: ICD-10-CM

## 2024-01-30 PROCEDURE — 99213 OFFICE O/P EST LOW 20 MIN: CPT

## 2024-01-30 RX ORDER — ESCITALOPRAM OXALATE 10 MG/1
1 TABLET TABLET, FILM COATED ORAL ONCE A DAY
Status: ACTIVE | COMMUNITY

## 2024-01-30 RX ORDER — NIVOLUMAB 10 MG/ML
AS DIRECTED INJECTION INTRAVENOUS EVERY 2 WEEKS
Status: ON HOLD | COMMUNITY

## 2024-01-30 RX ORDER — APIXABAN 5 MG/1
TAKE 1 TABLET TWICE DAILY TABLET, FILM COATED ORAL
Refills: 0 | Status: ACTIVE | COMMUNITY
Start: 2018-11-16

## 2024-01-30 RX ORDER — BUPROPION HCL 300 MG
TAKE 1 TABLET DAILY TABLET, EXTENDED RELEASE 24 HR ORAL
Refills: 0 | Status: ACTIVE | COMMUNITY
Start: 2018-11-30

## 2024-01-30 RX ORDER — POLYETHYLENE GLYCOL 3350, SODIUM CHLORIDE, SODIUM BICARBONATE, POTASSIUM CHLORIDE 420; 11.2; 5.72; 1.48 G/4L; G/4L; G/4L; G/4L
AS DIRECTED POWDER, FOR SOLUTION ORAL ONCE
Qty: 420 GM | Refills: 0 | OUTPATIENT
Start: 2024-01-30 | End: 2024-02-29

## 2024-01-30 RX ORDER — TEMAZEPAM 30 MG/1
TAKE 1 CAPSULE AT BEDTIME AS NEEDED CAPSULE ORAL
Refills: 0 | Status: ACTIVE | COMMUNITY
Start: 2018-11-30

## 2024-01-30 RX ORDER — OMEPRAZOLE 40 MG/1
1 CAPSULE 30 MINUTES BEFORE MORNING MEAL CAPSULE, DELAYED RELEASE ORAL ONCE A DAY
Qty: 90 | Refills: 1 | OUTPATIENT
Start: 2024-01-30

## 2024-01-30 RX ORDER — CHLORHEXIDINE GLUCONATE 4 %
TAKE 1 TABLET BY MOUTH TWICE A DAY LIQUID (ML) TOPICAL
Qty: 60 | Refills: 0 | Status: ACTIVE | COMMUNITY
Start: 2018-07-06

## 2024-01-30 RX ORDER — OMEPRAZOLE 40 MG/1
2 CAPSULES CAPSULE, DELAYED RELEASE ORAL TWICE DAILY
Status: ACTIVE | COMMUNITY
Start: 2019-01-07

## 2024-01-30 RX ORDER — ALPRAZOLAM 0.5 MG/1
TAKE 1 TABLET TWICE DAILY AS NEEDED TABLET ORAL
Refills: 0 | Status: ACTIVE | COMMUNITY
Start: 2018-07-06

## 2024-01-30 RX ORDER — AZELASTINE HYDROCHLORIDE AND FLUTICASONE PROPIONATE 137; 50 UG/1; UG/1
1 SPRAY IN EACH NOSTRIL SPRAY, METERED NASAL TWICE A DAY
Status: ACTIVE | COMMUNITY

## 2024-01-30 RX ORDER — METOPROLOL SUCCINATE 25 MG/1
TABLET, EXTENDED RELEASE ORAL
Qty: 30 | Refills: 0 | Status: ACTIVE | COMMUNITY
Start: 2012-11-20

## 2024-01-30 NOTE — HPI-TODAY'S VISIT:
(1/4/22): The patient is a very pleasant 66-year-old female last seen in the office in January 2019 to discuss management of a large tubulovillous adenoma.  She had been diagnosed at that time though with lung cancer with brain metastases in December 2016.  Colonoscopy in January 2019 due to a history of colon polyps and colon rectal cancer in her mother over age 60 revealed a tattoo in the cecum and a 3 mm transverse colon polyp.  There was a good prep present.  She was recommended a 5-year follow-up.  The patient comes in now with a new problem.  She states that over the last year she has developed problems at night often waking up coughing.  Sometimes coughing to the point that she vomits.  She was having bad heartburn and her primary care physician increased her omeprazole from 40 mg twice a day to 80 mg twice a day, and she has had good control of her heartburn but continues to have the cough.  She states her primary care physician tried to switch her to Dexilant but her insurance would not cover this.  She denies any dysphagia.  There is no nausea.  There is some early satiety and bloating.  Her bowel movements are normal.  She states her CT scans to follow-up her lung cancer have routinely shown a hiatal hernia.  We do not have those results available to us though.   Interval history: 66-year-old female with history of colon polyps and family history of colon cancer due surveillance 2024 presents for follow-up after EGD.  She was last seen 1/4/2022.  She complained of nighttime coughing which was suspected to be secondary to acid reflux however symptoms continued despite being on high dose of omeprazole.  There was possibility of partial gastric outlet obstruction or gastroparesis. She was planned for a gastric emptying scan and EGD for further evaluation.  GES 2/21/2022: No evidence of delayed gastric emptying.  EGD 2/1/2022:Mucosal changes in the duodenum which were biopsied.  Medium size hiatal hernia.  2 gastric polyps which were biopsied.  Normal esophagus.  Duodenal biopsy revealed no significant abnormality.  Stomach biopsy revealed polypoid foveolar hyperplasia and fundic gland polyp without evidence of H. pylori, intestinal metaplasia, dysplasia or malignancy.   She has experienced one episode of vomiting since her last visit. She usually feels fine when she goes to bed and will wake up in the middle of the night/early morning with nausea then coughing and vomiting. She does feel sinus drainage may contribute to her coughing. She is not currently on any antihistamines though she does have a history of seasonal allergies. She feels as if something is tickling her throat. The vomit does contain food on occasion. No unintentional weight loss. No abdominal pain. Bowel movements are normal. She tries to avoid spicy foods. She does not believe there is a food trigger, but she admits the vomiting will occur if she overeats. She is planned for a chest CT scan on Monday. She has a CT scan every 4 months for lung cancer surveillance. She just had a brain MRI which was clear. She gets brain MRIs with Dr. Negrete every 6 months. Interval history.  The patient states her cough has resolved.  She is not sure why.  She does think twice a day proton pump inhibitors help, but she also thinks a nasal spray that her pulmonologist placed her on has helped.  She states CT scan back in April showed no progression of her lung cancer and actually showed shrinkage of her longstanding goiter.  Interval history, 1/30/2024: 68-year-old female presents for long interval follow-up.  Her reflux remains well-controlled on twice daily PPI therapy.  She is due for a colonoscopy this year due to family history of colon cancer.  She is followed by Dr. Ley for stage IV lung cancer.  She was previously on Opdivo.  She has not had treatment since June 2023.  She is on therapeutic anticoagulation with Eliquis 5 mg twice daily.  Labs 9/21/2023:Normal H/H, , normal platelets, normal LFTs.  She is now getting contrast CTs every 3 months for monitoring given her history of lung cancer. After her last CT with oral and IV contrast, she began experiencing left sided throat pain. This was described as a sore throat. This has since resolved. SHe plans to discuss with Dr. ley. She is otherwise doing well from a GI standpoint. Her reflux is doing "okay" however she is only taking 2 OTC Prilosec daily and Gaviscon as needed. She was taking 80 mg of Omeprazole twice daily.

## 2024-02-27 ENCOUNTER — COLON (OUTPATIENT)
Dept: URBAN - METROPOLITAN AREA SURGERY CENTER 25 | Facility: SURGERY CENTER | Age: 69
End: 2024-02-27

## 2024-02-27 RX ORDER — BUPROPION HCL 300 MG
TAKE 1 TABLET DAILY TABLET, EXTENDED RELEASE 24 HR ORAL
Refills: 0 | Status: ACTIVE | COMMUNITY
Start: 2018-11-30

## 2024-02-27 RX ORDER — ESCITALOPRAM OXALATE 10 MG/1
1 TABLET TABLET, FILM COATED ORAL ONCE A DAY
Status: ACTIVE | COMMUNITY

## 2024-02-27 RX ORDER — APIXABAN 5 MG/1
TAKE 1 TABLET TWICE DAILY TABLET, FILM COATED ORAL
Refills: 0 | Status: ACTIVE | COMMUNITY
Start: 2018-11-16

## 2024-02-27 RX ORDER — CHLORHEXIDINE GLUCONATE 4 %
TAKE 1 TABLET BY MOUTH TWICE A DAY LIQUID (ML) TOPICAL
Qty: 60 | Refills: 0 | Status: ACTIVE | COMMUNITY
Start: 2018-07-06

## 2024-02-27 RX ORDER — AZELASTINE HYDROCHLORIDE AND FLUTICASONE PROPIONATE 137; 50 UG/1; UG/1
1 SPRAY IN EACH NOSTRIL SPRAY, METERED NASAL TWICE A DAY
Status: ACTIVE | COMMUNITY

## 2024-02-27 RX ORDER — OMEPRAZOLE 40 MG/1
2 CAPSULES CAPSULE, DELAYED RELEASE ORAL TWICE DAILY
Status: ACTIVE | COMMUNITY
Start: 2019-01-07

## 2024-02-27 RX ORDER — OMEPRAZOLE 40 MG/1
1 CAPSULE 30 MINUTES BEFORE MORNING MEAL CAPSULE, DELAYED RELEASE ORAL ONCE A DAY
Qty: 90 | Refills: 1 | Status: ACTIVE | COMMUNITY
Start: 2024-01-30

## 2024-02-27 RX ORDER — ALPRAZOLAM 0.5 MG/1
TAKE 1 TABLET TWICE DAILY AS NEEDED TABLET ORAL
Refills: 0 | Status: ACTIVE | COMMUNITY
Start: 2018-07-06

## 2024-02-27 RX ORDER — NIVOLUMAB 10 MG/ML
AS DIRECTED INJECTION INTRAVENOUS EVERY 2 WEEKS
Status: ON HOLD | COMMUNITY

## 2024-02-27 RX ORDER — METOPROLOL SUCCINATE 25 MG/1
TABLET, EXTENDED RELEASE ORAL
Qty: 30 | Refills: 0 | Status: ACTIVE | COMMUNITY
Start: 2012-11-20

## 2024-02-27 RX ORDER — POLYETHYLENE GLYCOL 3350, SODIUM CHLORIDE, SODIUM BICARBONATE, POTASSIUM CHLORIDE 420; 11.2; 5.72; 1.48 G/4L; G/4L; G/4L; G/4L
AS DIRECTED POWDER, FOR SOLUTION ORAL ONCE
Qty: 420 GM | Refills: 0 | Status: ACTIVE | COMMUNITY
Start: 2024-01-30 | End: 2024-02-29

## 2024-02-27 RX ORDER — TEMAZEPAM 30 MG/1
TAKE 1 CAPSULE AT BEDTIME AS NEEDED CAPSULE ORAL
Refills: 0 | Status: ACTIVE | COMMUNITY
Start: 2018-11-30

## 2024-07-25 ENCOUNTER — ERX REFILL RESPONSE (OUTPATIENT)
Dept: URBAN - METROPOLITAN AREA CLINIC 113 | Facility: CLINIC | Age: 69
End: 2024-07-25

## 2024-07-25 RX ORDER — OMEPRAZOLE 40 MG/1
1 CAPSULE 30 MINUTES BEFORE MORNING MEAL CAPSULE, DELAYED RELEASE ORAL ONCE A DAY
Qty: 90 | Refills: 1 | OUTPATIENT

## 2025-01-13 ENCOUNTER — ERX REFILL RESPONSE (OUTPATIENT)
Dept: URBAN - METROPOLITAN AREA CLINIC 113 | Facility: CLINIC | Age: 70
End: 2025-01-13

## 2025-01-13 RX ORDER — OMEPRAZOLE 40 MG/1
1 CAPSULE 30 MINUTES BEFORE MORNING MEAL CAPSULE, DELAYED RELEASE ORAL ONCE A DAY
Qty: 90 | Refills: 1 | OUTPATIENT

## 2025-01-13 RX ORDER — OMEPRAZOLE 40 MG/1
TAKE 1 CAPSULE BY MOUTH EVERY DAY 30 MINUTES BEFORE MORNING MEAL FOR 90 DAYS CAPSULE, DELAYED RELEASE ORAL
Qty: 90 CAPSULE | Refills: 0 | OUTPATIENT

## 2025-04-14 ENCOUNTER — ERX REFILL RESPONSE (OUTPATIENT)
Dept: URBAN - METROPOLITAN AREA CLINIC 113 | Facility: CLINIC | Age: 70
End: 2025-04-14

## 2025-04-14 RX ORDER — OMEPRAZOLE 40 MG/1
TAKE 1 CAPSULE BY MOUTH EVERY DAY 30 MINUTES BEFORE MORNING MEAL CAPSULE, DELAYED RELEASE ORAL
Qty: 90 CAPSULE | Refills: 0